# Patient Record
Sex: FEMALE | Race: WHITE | NOT HISPANIC OR LATINO | URBAN - METROPOLITAN AREA
[De-identification: names, ages, dates, MRNs, and addresses within clinical notes are randomized per-mention and may not be internally consistent; named-entity substitution may affect disease eponyms.]

---

## 2017-09-12 ENCOUNTER — EMERGENCY (EMERGENCY)
Facility: HOSPITAL | Age: 72
LOS: 1 days | Discharge: HOME | End: 2017-09-12
Attending: EMERGENCY MEDICINE | Admitting: EMERGENCY MEDICINE
Payer: COMMERCIAL

## 2017-09-12 PROCEDURE — 99282 EMERGENCY DEPT VISIT SF MDM: CPT

## 2018-01-02 ENCOUNTER — APPOINTMENT (OUTPATIENT)
Dept: CARDIOLOGY | Facility: CLINIC | Age: 73
End: 2018-01-02
Payer: MEDICARE

## 2018-01-02 PROCEDURE — 99214 OFFICE O/P EST MOD 30 MIN: CPT

## 2018-01-02 PROCEDURE — 93000 ELECTROCARDIOGRAM COMPLETE: CPT

## 2018-03-15 ENCOUNTER — RECORD ABSTRACTING (OUTPATIENT)
Age: 73
End: 2018-03-15

## 2018-03-15 DIAGNOSIS — R01.1 CARDIAC MURMUR, UNSPECIFIED: ICD-10-CM

## 2018-03-15 DIAGNOSIS — R42 DIZZINESS AND GIDDINESS: ICD-10-CM

## 2018-03-16 RX ORDER — ASPIRIN 81 MG
81 TABLET, DELAYED RELEASE (ENTERIC COATED) ORAL DAILY
Qty: 90 | Refills: 3 | Status: ACTIVE | COMMUNITY

## 2018-04-04 ENCOUNTER — APPOINTMENT (OUTPATIENT)
Dept: CARDIOLOGY | Facility: CLINIC | Age: 73
End: 2018-04-04
Payer: MEDICARE

## 2018-04-04 VITALS
DIASTOLIC BLOOD PRESSURE: 80 MMHG | BODY MASS INDEX: 41.35 KG/M2 | HEART RATE: 85 BPM | HEIGHT: 61 IN | WEIGHT: 219 LBS | RESPIRATION RATE: 14 BRPM | SYSTOLIC BLOOD PRESSURE: 142 MMHG

## 2018-04-04 PROCEDURE — 99214 OFFICE O/P EST MOD 30 MIN: CPT

## 2018-04-04 PROCEDURE — 93000 ELECTROCARDIOGRAM COMPLETE: CPT

## 2018-04-04 RX ORDER — AZITHROMYCIN 250 MG/1
250 TABLET, FILM COATED ORAL
Qty: 6 | Refills: 0 | Status: COMPLETED | COMMUNITY
Start: 2017-10-16

## 2018-04-04 RX ORDER — METFORMIN ER 750 MG 750 MG/1
750 TABLET ORAL
Qty: 90 | Refills: 0 | Status: ACTIVE | COMMUNITY
Start: 2017-12-28

## 2018-04-04 RX ORDER — ALBUTEROL SULFATE 90 UG/1
108 (90 BASE) AEROSOL, METERED RESPIRATORY (INHALATION)
Qty: 8 | Refills: 0 | Status: ACTIVE | COMMUNITY
Start: 2018-02-06

## 2018-04-04 RX ORDER — UBIDECARENONE 200 MG
CAPSULE ORAL
Refills: 0 | Status: ACTIVE | COMMUNITY

## 2018-06-06 ENCOUNTER — APPOINTMENT (OUTPATIENT)
Dept: CARDIOLOGY | Facility: CLINIC | Age: 73
End: 2018-06-06
Payer: MEDICARE

## 2018-06-06 PROCEDURE — 93306 TTE W/DOPPLER COMPLETE: CPT

## 2018-10-03 ENCOUNTER — APPOINTMENT (OUTPATIENT)
Dept: CARDIOLOGY | Facility: CLINIC | Age: 73
End: 2018-10-03
Payer: MEDICARE

## 2018-10-03 VITALS
WEIGHT: 220 LBS | DIASTOLIC BLOOD PRESSURE: 70 MMHG | HEIGHT: 61 IN | RESPIRATION RATE: 15 BRPM | HEART RATE: 88 BPM | SYSTOLIC BLOOD PRESSURE: 130 MMHG | BODY MASS INDEX: 41.54 KG/M2

## 2018-10-03 DIAGNOSIS — R53.83 OTHER FATIGUE: ICD-10-CM

## 2018-10-03 PROCEDURE — 99214 OFFICE O/P EST MOD 30 MIN: CPT

## 2018-10-03 PROCEDURE — 93000 ELECTROCARDIOGRAM COMPLETE: CPT

## 2018-10-03 RX ORDER — VORTIOXETINE 10 MG/1
10 TABLET, FILM COATED ORAL
Refills: 0 | Status: ACTIVE | COMMUNITY

## 2018-10-03 RX ORDER — MUPIROCIN 20 MG/G
2 OINTMENT TOPICAL
Qty: 22 | Refills: 0 | Status: DISCONTINUED | COMMUNITY
Start: 2017-11-13 | End: 2018-10-03

## 2019-04-01 ENCOUNTER — APPOINTMENT (OUTPATIENT)
Dept: CARDIOLOGY | Facility: CLINIC | Age: 74
End: 2019-04-01
Payer: MEDICARE

## 2019-04-01 ENCOUNTER — NON-APPOINTMENT (OUTPATIENT)
Age: 74
End: 2019-04-01

## 2019-04-01 VITALS
HEART RATE: 84 BPM | RESPIRATION RATE: 15 BRPM | SYSTOLIC BLOOD PRESSURE: 139 MMHG | WEIGHT: 229 LBS | HEIGHT: 61 IN | DIASTOLIC BLOOD PRESSURE: 79 MMHG | BODY MASS INDEX: 43.23 KG/M2

## 2019-04-01 DIAGNOSIS — M79.7 FIBROMYALGIA: ICD-10-CM

## 2019-04-01 PROCEDURE — 99214 OFFICE O/P EST MOD 30 MIN: CPT

## 2019-04-01 PROCEDURE — 93000 ELECTROCARDIOGRAM COMPLETE: CPT

## 2019-04-01 NOTE — REVIEW OF SYSTEMS
[Feeling Fatigued] : feeling fatigued [see HPI] : see HPI [Lower Back Pain] : lower back pain [Negative] : Heme/Lymph

## 2019-04-02 NOTE — ASSESSMENT
[FreeTextEntry1] : 1.  EKG today demonstrates sinus rhythm at 84 bpm.  Normal intervals.  No evidence of ischemia.  \par 2.  Fatigue:  Patient has gained nine pounds since her last office visit and is sedentary due to back issues.  I have advised her on a strict low-caloric diet.  \par 3.  Peripheral edema:  Patient has developed at least 1+ bilateral pitting edema.  Less than ideally compliant with a low-salt diet.  States she eats a lot of cheese.  Will add Furosemide 20 mg daily to current medical regimen.  Will have patient undergo bloodwork in four weeks to assess renal function.  A vitamin D3 level will be obtained as well.  Follow up office visit six weeks.   \par

## 2019-04-02 NOTE — PHYSICAL EXAM
[General Appearance - Well Developed] : well developed [General Appearance - In No Acute Distress] : no acute distress [Normal Conjunctiva] : the conjunctiva exhibited no abnormalities [Normal Oral Mucosa] : normal oral mucosa [Normal Oropharynx] : normal oropharynx [Auscultation Breath Sounds / Voice Sounds] : lungs were clear to auscultation bilaterally [Heart Rate And Rhythm] : heart rate and rhythm were normal [Heart Sounds] : normal S1 and S2 [Systolic grade ___/6] : A grade [unfilled]/6 systolic murmur was heard. [Bowel Sounds] : normal bowel sounds [Abnormal Walk] : normal gait [Skin Color & Pigmentation] : normal skin color and pigmentation [Skin Turgor] : normal skin turgor [Oriented To Time, Place, And Person] : oriented to person, place, and time [Affect] : the affect was normal [Mood] : the mood was normal [FreeTextEntry1] : B/L 1+ edema

## 2019-04-02 NOTE — HISTORY OF PRESENT ILLNESS
[FreeTextEntry1] : Mrs. Massey presents today without complaints of exertional chest pain, shortness of breath, palpitations, lightheadedness, or syncope.  She continues to complain of fatigue.  She recently underwent some bloodwork which revealed normal thyroid function as well as a negative test for Lyme’s disease.  She is currently on vitamin D for prior Hypovitaminosis D diagnosis.

## 2019-04-02 NOTE — REASON FOR VISIT
[FreeTextEntry1] : Mrs. Massey is a delightful 74-year-old obese white female with a past medical history significant for hypertension, hypercholesterolemia, who presents for evaluation.

## 2019-05-16 ENCOUNTER — NON-APPOINTMENT (OUTPATIENT)
Age: 74
End: 2019-05-16

## 2019-05-16 ENCOUNTER — APPOINTMENT (OUTPATIENT)
Dept: CARDIOLOGY | Facility: CLINIC | Age: 74
End: 2019-05-16
Payer: MEDICARE

## 2019-05-16 VITALS
HEIGHT: 61 IN | DIASTOLIC BLOOD PRESSURE: 80 MMHG | SYSTOLIC BLOOD PRESSURE: 139 MMHG | HEART RATE: 78 BPM | WEIGHT: 235 LBS | BODY MASS INDEX: 44.37 KG/M2 | RESPIRATION RATE: 15 BRPM

## 2019-05-16 PROCEDURE — 99214 OFFICE O/P EST MOD 30 MIN: CPT

## 2019-05-16 PROCEDURE — 93000 ELECTROCARDIOGRAM COMPLETE: CPT

## 2019-05-16 NOTE — REVIEW OF SYSTEMS
[Feeling Fatigued] : feeling fatigued [Lower Back Pain] : lower back pain [see HPI] : see HPI [Memory Lapses Or Loss] : memory lapses or loss [Negative] : Heme/Lymph

## 2019-05-17 NOTE — REASON FOR VISIT
[FreeTextEntry1] : Mrs. Massey is a pleasant 73-year-old white female with a past medical history significant for hypertension, hypercholesterolemia, and obesity who presents for follow up evaluation.

## 2019-05-17 NOTE — ASSESSMENT
[FreeTextEntry1] : 1.  EKG today reveals normal sinus rhythm at 78 bpm.  Normal intervals.  No evidence of ischemia.  \par 2.  Hypertension:  Blood pressure under reasonable control at this time.  \par 3.  Hyperlipidemia:  Patient advised on a low-fat / low-cholesterol diet.  \par 4.  Diabetes mellitus:  Recent fasting glucose 173 with hemoglobin A1C of 9.0.  Patient advised on a strict low-carbohydrate diet and follow up with his PCP.\par 5.  Peripheral edema:  20 mg of Furosemide every other day has not improved the patient’s edema.  She is advised to augment now to 20 mg daily and cut back on her salt intake.  Was advised to follow up with her PCP.  If clinically stable, follow up here three months.    \par \par

## 2019-05-17 NOTE — HISTORY OF PRESENT ILLNESS
[FreeTextEntry1] : Mrs. Massey presents today without complaints of exertional chest pain or shortness of breath.  She was recently evaluated by neurology for forgetfulness and was felt to have elements of dementia on brain MRI.  She was started on Aricept.  She also is felt to have obstructive sleep apnea and last night underwent a sleep study.  Results pending.

## 2019-08-20 ENCOUNTER — NON-APPOINTMENT (OUTPATIENT)
Age: 74
End: 2019-08-20

## 2019-08-20 ENCOUNTER — APPOINTMENT (OUTPATIENT)
Dept: CARDIOLOGY | Facility: CLINIC | Age: 74
End: 2019-08-20
Payer: MEDICARE

## 2019-08-20 VITALS
HEART RATE: 96 BPM | SYSTOLIC BLOOD PRESSURE: 118 MMHG | BODY MASS INDEX: 43.61 KG/M2 | DIASTOLIC BLOOD PRESSURE: 68 MMHG | WEIGHT: 231 LBS | RESPIRATION RATE: 15 BRPM | HEIGHT: 61 IN

## 2019-08-20 PROCEDURE — 93000 ELECTROCARDIOGRAM COMPLETE: CPT

## 2019-08-20 PROCEDURE — 99214 OFFICE O/P EST MOD 30 MIN: CPT

## 2019-08-20 RX ORDER — MIRABEGRON 25 MG/1
25 TABLET, FILM COATED, EXTENDED RELEASE ORAL
Refills: 0 | Status: ACTIVE | COMMUNITY

## 2019-08-20 RX ORDER — TURMERIC ROOT EXTRACT 500 MG
TABLET ORAL
Refills: 0 | Status: ACTIVE | COMMUNITY

## 2019-08-20 RX ORDER — FLUTICASONE FUROATE AND VILANTEROL TRIFENATATE 100; 25 UG/1; UG/1
100-25 POWDER RESPIRATORY (INHALATION)
Qty: 60 | Refills: 0 | Status: DISCONTINUED | COMMUNITY
Start: 2017-10-16 | End: 2019-08-20

## 2019-08-21 NOTE — ASSESSMENT
[FreeTextEntry1] : 1.  EKG today reveals sinus rhythm at 96 bpm.  Normal intervals.  No evidence of ischemia. \par 2.  Lightheadedness/weakness.  Patient with lower than usual blood pressure.  Will reduce Lisinopril from 20 to 10 mg daily.  Patient to continue other medications. \par 3.  Peripheral edema:  At this time, no additional diuretic therapy can be offered.  Once Lisinopril reduction enhances blood pressure, may consider augmenting Furosemide to a 40 mg a day dose.  Patient advised on a strict low-salt diet and follow up within six weeks.    \par

## 2019-08-21 NOTE — PHYSICAL EXAM
[General Appearance - Well Developed] : well developed [General Appearance - In No Acute Distress] : no acute distress [Normal Conjunctiva] : the conjunctiva exhibited no abnormalities [Normal Oral Mucosa] : normal oral mucosa [Normal Oropharynx] : normal oropharynx [Auscultation Breath Sounds / Voice Sounds] : lungs were clear to auscultation bilaterally [Heart Rate And Rhythm] : heart rate and rhythm were normal [Heart Sounds] : normal S1 and S2 [Systolic grade ___/6] : A grade [unfilled]/6 systolic murmur was heard. [Bowel Sounds] : normal bowel sounds [Abnormal Walk] : normal gait [Skin Color & Pigmentation] : normal skin color and pigmentation [Skin Turgor] : normal skin turgor [Oriented To Time, Place, And Person] : oriented to person, place, and time [Affect] : the affect was normal [Mood] : the mood was normal [FreeTextEntry1] : S4 gallop.

## 2019-08-21 NOTE — REASON FOR VISIT
[FreeTextEntry1] : Mrs. Massey is a pleasant 74-year-old morbidly obese white female with a past medical history significant for hypertension, hyperlipidemia, obstructive sleep apnea, and early dementia, who presents for evaluation.

## 2019-08-21 NOTE — HISTORY OF PRESENT ILLNESS
[FreeTextEntry1] : From a cardiac standpoint, she denies exertional chest pain, shortness of breath, palpitations, lightheadedness, or syncope.  She states she recently underwent a sleep study and has profound obstructive sleep apnea.  She is currently being fitted for CPAP mask. \par   \par

## 2019-09-27 ENCOUNTER — APPOINTMENT (OUTPATIENT)
Dept: CARDIOLOGY | Facility: CLINIC | Age: 74
End: 2019-09-27
Payer: MEDICARE

## 2019-09-27 ENCOUNTER — NON-APPOINTMENT (OUTPATIENT)
Age: 74
End: 2019-09-27

## 2019-09-27 VITALS
WEIGHT: 232 LBS | HEART RATE: 81 BPM | DIASTOLIC BLOOD PRESSURE: 72 MMHG | HEIGHT: 61 IN | OXYGEN SATURATION: 96 % | RESPIRATION RATE: 15 BRPM | BODY MASS INDEX: 43.8 KG/M2 | SYSTOLIC BLOOD PRESSURE: 118 MMHG

## 2019-09-27 PROCEDURE — 99214 OFFICE O/P EST MOD 30 MIN: CPT

## 2019-09-27 PROCEDURE — 93000 ELECTROCARDIOGRAM COMPLETE: CPT

## 2019-10-07 NOTE — HISTORY OF PRESENT ILLNESS
[FreeTextEntry1] : Mrs. Massey presents today for follow up evaluation.  Presently she is without complaints of chest pain, shortness of breath, palpitations, lightheadedness or syncope.  Her blood pressure today is the same as previous visit even with the decrease in lisinopril dose.  Does not feel any improvement of her lightheadedness/weakness.  Admits that she is not very compliant with her diet and low sodium intake.

## 2019-10-07 NOTE — DISCUSSION/SUMMARY
[FreeTextEntry1] : Case and plan discussed with Dr. Olivera.\par \par 1 - Lightheadedness/weakness:  blood pressure still on the low side for patient even with the decrease in lisinopril to 10mg daily.  Does not feel much improvement in her lightheadedness.  Will decrease lisinopril to 5mg daily.\par \par 2 - Peripheral edema: patient and  both state that there has been some improvement.  However, her left leg is more edematous than her right.  WIll order a venous duplex to r/o any possible DVT.\par \par 3 - Follow up in 5 weeks.

## 2019-10-07 NOTE — REASON FOR VISIT
[FreeTextEntry1] : The patient is a 74 y.o. morbidly obese white female with a past medical history significant for hypertension, hyperlipidemia, obstructive sleep apnea and early dementia who presents for evaluation.

## 2019-10-07 NOTE — PHYSICAL EXAM
[General Appearance - In No Acute Distress] : no acute distress [General Appearance - Well Developed] : well developed [Normal Conjunctiva] : the conjunctiva exhibited no abnormalities [Normal Oral Mucosa] : normal oral mucosa [] : no respiratory distress [Auscultation Breath Sounds / Voice Sounds] : lungs were clear to auscultation bilaterally [Heart Rate And Rhythm] : heart rate and rhythm were normal [Heart Sounds] : normal S1 and S2 [Bowel Sounds] : normal bowel sounds [Skin Color & Pigmentation] : normal skin color and pigmentation [Skin Turgor] : normal skin turgor [Oriented To Time, Place, And Person] : oriented to person, place, and time [Affect] : the affect was normal [Mood] : the mood was normal [FreeTextEntry1] : Walks with cane

## 2019-10-11 ENCOUNTER — APPOINTMENT (OUTPATIENT)
Dept: CARDIOLOGY | Facility: CLINIC | Age: 74
End: 2019-10-11
Payer: MEDICARE

## 2019-10-11 PROCEDURE — 93970 EXTREMITY STUDY: CPT

## 2019-11-15 ENCOUNTER — APPOINTMENT (OUTPATIENT)
Dept: CARDIOLOGY | Facility: CLINIC | Age: 74
End: 2019-11-15
Payer: MEDICARE

## 2019-11-15 ENCOUNTER — NON-APPOINTMENT (OUTPATIENT)
Age: 74
End: 2019-11-15

## 2019-11-15 VITALS
SYSTOLIC BLOOD PRESSURE: 130 MMHG | DIASTOLIC BLOOD PRESSURE: 70 MMHG | HEART RATE: 87 BPM | WEIGHT: 230 LBS | BODY MASS INDEX: 43.43 KG/M2 | RESPIRATION RATE: 16 BRPM | HEIGHT: 61 IN

## 2019-11-15 PROCEDURE — 93000 ELECTROCARDIOGRAM COMPLETE: CPT

## 2019-11-15 PROCEDURE — 99214 OFFICE O/P EST MOD 30 MIN: CPT

## 2019-11-15 NOTE — REASON FOR VISIT
[FreeTextEntry1] : The patient is a 74 y.o. morbidly obese white female with a past medical history significant for hypertension, hyperlipidemia, obstructive sleep apnea and early dementia who presents for follow up evaluation.

## 2019-11-15 NOTE — DISCUSSION/SUMMARY
[FreeTextEntry1] : 1 - Hypertension:  blood pressure well controlled on current medications.  Lightheadedness is somewhat improved with the decrease in lisinopril dose.  Advised to follow strict low sodium diet.  \par \par 2 - Peripheral edema:  edema is much improved.  Recent venous duplex showed no evidence of DVT in the lower extremities.\par \par 3 - Morbid obesity:  patient states she feels somewhat fatigued.  Advised to increase her physical activity a bit as she is quite sedentary.  SHould follow a low caloric diet and attempt weight loss.\par \par 4 - Will obtain recent labs done at her PCPs office.  Fasting labs prior to follow visit.\par \par 5 - Follow up with Dr. Olivera in 3 months.

## 2019-11-15 NOTE — HISTORY OF PRESENT ILLNESS
[FreeTextEntry1] : Mrs. Massey presents today for follow up evaluation and results of her recent venous duplex.  Presently without exertional chest pain, palpitations, or syncope.  She does experience some mild shortness of breath with exertion.  Lightheadedness has improved somewhat with the decrease in lisinopril to 5mg daily.  Admits that she is very sedentary.  Also not very compliant with her diet or low sodium intake.

## 2019-11-15 NOTE — PHYSICAL EXAM
[General Appearance - Well Developed] : well developed [General Appearance - In No Acute Distress] : no acute distress [Normal Conjunctiva] : the conjunctiva exhibited no abnormalities [Normal Oral Mucosa] : normal oral mucosa [] : no respiratory distress [Auscultation Breath Sounds / Voice Sounds] : lungs were clear to auscultation bilaterally [Heart Rate And Rhythm] : heart rate and rhythm were normal [Heart Sounds] : normal S1 and S2 [Bowel Sounds] : normal bowel sounds [Skin Color & Pigmentation] : normal skin color and pigmentation [Skin Turgor] : normal skin turgor [Oriented To Time, Place, And Person] : oriented to person, place, and time [Affect] : the affect was normal [Mood] : the mood was normal [FreeTextEntry1] : Trace bilateral LE edema

## 2020-02-14 ENCOUNTER — APPOINTMENT (OUTPATIENT)
Dept: CARDIOLOGY | Facility: CLINIC | Age: 75
End: 2020-02-14
Payer: MEDICARE

## 2020-02-14 VITALS
HEIGHT: 61 IN | RESPIRATION RATE: 16 BRPM | HEART RATE: 81 BPM | DIASTOLIC BLOOD PRESSURE: 60 MMHG | WEIGHT: 226 LBS | BODY MASS INDEX: 42.67 KG/M2 | SYSTOLIC BLOOD PRESSURE: 120 MMHG

## 2020-02-14 DIAGNOSIS — E55.9 VITAMIN D DEFICIENCY, UNSPECIFIED: ICD-10-CM

## 2020-02-14 PROCEDURE — 93000 ELECTROCARDIOGRAM COMPLETE: CPT

## 2020-02-14 PROCEDURE — 99214 OFFICE O/P EST MOD 30 MIN: CPT

## 2020-02-14 RX ORDER — MECLIZINE HYDROCHLORIDE 12.5 MG/1
12.5 TABLET ORAL
Refills: 0 | Status: DISCONTINUED | COMMUNITY
End: 2020-02-14

## 2020-02-14 RX ORDER — DONEPEZIL HYDROCHLORIDE 10 MG/1
10 TABLET, FILM COATED ORAL
Refills: 0 | Status: DISCONTINUED | COMMUNITY
End: 2020-02-14

## 2020-02-14 RX ORDER — LISINOPRIL 5 MG/1
5 TABLET ORAL DAILY
Qty: 90 | Refills: 1 | Status: DISCONTINUED | COMMUNITY
Start: 2019-09-27 | End: 2020-02-14

## 2020-02-17 NOTE — HISTORY OF PRESENT ILLNESS
[FreeTextEntry1] :   From a cardiac standpoint, Mrs. Massey denies exertional chest pain at this time.  She does admit to dyspnea on exertion which she continues to blame on her weight and lack of physical exercise.

## 2020-02-17 NOTE — ASSESSMENT
[FreeTextEntry1] :  1.  EKG today reveals sinus rhythm at 81 bpm.  Normal intervals.  No evidence of ischemia.   2.  Hypertension:  Blood pressure well controlled at this time on current medications.    3.  Peripheral edema:  Peripheral edema persists despite current medications.  A stricter low-salt diet is advised.  Patient also advised to increase Furosemide from 20 to 40 mg daily.  Potassium supplementation (KCL 20 mEq daily) prescribed.  Patient will undergo follow up bloodwork to assess renal function in approximately 2 ½ weeks with a follow up office visit thereafter.   4.  Diabetes mellitus:  Recent fasting glucose 239 with a hemoglobin A1C at 8.4.  Patient advised on a stricter low-carbohydrate diet as well as follow up through her PCP’s office to adjust current medications.

## 2020-02-17 NOTE — PHYSICAL EXAM
[General Appearance - In No Acute Distress] : no acute distress [General Appearance - Well Developed] : well developed [Normal Conjunctiva] : the conjunctiva exhibited no abnormalities [Normal Oropharynx] : normal oropharynx [Normal Oral Mucosa] : normal oral mucosa [Heart Rate And Rhythm] : heart rate and rhythm were normal [Auscultation Breath Sounds / Voice Sounds] : lungs were clear to auscultation bilaterally [Heart Sounds] : normal S1 and S2 [Systolic grade ___/6] : A grade [unfilled]/6 systolic murmur was heard. [Abnormal Walk] : normal gait [Bowel Sounds] : normal bowel sounds [Oriented To Time, Place, And Person] : oriented to person, place, and time [Skin Turgor] : normal skin turgor [Skin Color & Pigmentation] : normal skin color and pigmentation [Mood] : the mood was normal [Affect] : the affect was normal [FreeTextEntry1] : B/L 1-2+ edema

## 2020-02-17 NOTE — REASON FOR VISIT
[FreeTextEntry1] : Mrs. Massey is a pleasant 74-year-old obese white female with a past medical history significant for hypertension, hyperlipidemia, obstructive sleep apnea, and early dementia, who presents for follow up evaluation.

## 2020-02-17 NOTE — REVIEW OF SYSTEMS
[Feeling Fatigued] : feeling fatigued [Lower Back Pain] : lower back pain [see HPI] : see HPI [Memory Lapses Or Loss] : memory lapses or loss [Negative] : Integumentary

## 2020-03-04 ENCOUNTER — APPOINTMENT (OUTPATIENT)
Dept: CARDIOLOGY | Facility: CLINIC | Age: 75
End: 2020-03-04
Payer: MEDICARE

## 2020-03-04 VITALS
WEIGHT: 222 LBS | HEIGHT: 61 IN | DIASTOLIC BLOOD PRESSURE: 84 MMHG | RESPIRATION RATE: 16 BRPM | SYSTOLIC BLOOD PRESSURE: 138 MMHG | BODY MASS INDEX: 41.91 KG/M2 | OXYGEN SATURATION: 96 % | HEART RATE: 89 BPM

## 2020-03-04 DIAGNOSIS — R60.9 EDEMA, UNSPECIFIED: ICD-10-CM

## 2020-03-04 PROCEDURE — 99214 OFFICE O/P EST MOD 30 MIN: CPT

## 2020-03-09 PROBLEM — R60.9 PERIPHERAL EDEMA: Status: ACTIVE | Noted: 2019-04-01

## 2020-03-09 NOTE — PHYSICAL EXAM
[General Appearance - Well Developed] : well developed [General Appearance - In No Acute Distress] : no acute distress [Normal Conjunctiva] : the conjunctiva exhibited no abnormalities [Normal Oral Mucosa] : normal oral mucosa [] : no respiratory distress [Auscultation Breath Sounds / Voice Sounds] : lungs were clear to auscultation bilaterally [Heart Rate And Rhythm] : heart rate and rhythm were normal [Heart Sounds] : normal S1 and S2 [Bowel Sounds] : normal bowel sounds [Abnormal Walk] : normal gait [FreeTextEntry1] : 1+ bilateral LE edema [Skin Color & Pigmentation] : normal skin color and pigmentation [Skin Turgor] : normal skin turgor [Oriented To Time, Place, And Person] : oriented to person, place, and time [Affect] : the affect was normal [Mood] : the mood was normal

## 2020-03-09 NOTE — DISCUSSION/SUMMARY
[FreeTextEntry1] : 1 - Hypertension:  blood pressure borderline controlled on current medications.  Advised to follow strict low sodium diet and weight loss.\par \par 2 - Peripheral Edema:  LE edema has improved a bit.  Will continue with current diuretic therapy.   BUN 18, creatinine 0.65, potassium 4.5.\par \par 3 - Fasting blood work prior to follow up.\par \par 3 - Follow up in 3 months.\par \par

## 2020-03-09 NOTE — HISTORY OF PRESENT ILLNESS
[FreeTextEntry1] : Mrs. Massey presents today for follow up evaluation.  Presently feeling well.  Denies complaints of chest pain, shortness of breath, palpitations, lightheadedness or syncope.  Tolerating the increase of furosemide to 40 mg daily and the potassium.

## 2020-03-09 NOTE — REASON FOR VISIT
[FreeTextEntry1] : The patient is a 74 year-old morbidly obese white female with a past medical history significant for hypertension, hyperlipidemia, obstructive sleep apnea and early dementia who presents for follow up evaluation.

## 2020-04-28 ENCOUNTER — RX RENEWAL (OUTPATIENT)
Age: 75
End: 2020-04-28

## 2020-06-03 ENCOUNTER — APPOINTMENT (OUTPATIENT)
Dept: CARDIOLOGY | Facility: CLINIC | Age: 75
End: 2020-06-03
Payer: MEDICARE

## 2020-06-03 PROCEDURE — 99441: CPT | Mod: 95

## 2020-06-03 RX ORDER — FUROSEMIDE 20 MG/1
20 TABLET ORAL DAILY
Qty: 180 | Refills: 3 | Status: ACTIVE | COMMUNITY
Start: 2019-04-01 | End: 1900-01-01

## 2020-06-03 RX ORDER — SEMAGLUTIDE 0.68 MG/ML
INJECTION, SOLUTION SUBCUTANEOUS
Refills: 0 | Status: ACTIVE | COMMUNITY

## 2020-10-13 ENCOUNTER — APPOINTMENT (OUTPATIENT)
Dept: CARDIOLOGY | Facility: CLINIC | Age: 75
End: 2020-10-13
Payer: MEDICARE

## 2020-10-13 VITALS
RESPIRATION RATE: 15 BRPM | TEMPERATURE: 96.7 F | DIASTOLIC BLOOD PRESSURE: 70 MMHG | WEIGHT: 203 LBS | HEART RATE: 147 BPM | SYSTOLIC BLOOD PRESSURE: 118 MMHG | HEIGHT: 61 IN | BODY MASS INDEX: 38.33 KG/M2

## 2020-10-13 PROCEDURE — 93000 ELECTROCARDIOGRAM COMPLETE: CPT

## 2020-10-13 PROCEDURE — 99213 OFFICE O/P EST LOW 20 MIN: CPT

## 2020-10-14 NOTE — REASON FOR VISIT
[FreeTextEntry1] : Mrs. Massey is a pleasant 75-year-old white female with a past medical history significant for hyperlipidemia, obesity/obstructive sleep apnea, and early dementia, who presents for follow up. \par \par

## 2020-10-14 NOTE — ASSESSMENT
[FreeTextEntry1] : 1.  EKG today reveals normal sinus rhythm with sinus arrhythmia at 86 bpm.  Low voltage noted.  Right bundle branch block.  Qs in leads III and aVF which are a chronic finding for this patient.  No acute ischemic changes. \par \par 2.  Hypertension:  Blood pressure well controlled at this time on current medications.  Patient has lost nearly 20 pounds since her last office visit.  A low-salt diet was discussed. \par \par 3.  Hyperlipidemia:  Review of recent lipid profile demonstrates total cholesterol of 164, HDL 59, TC/HDL ratio 2.8, LDL 73, triglycerides 159.  Patient advised to continue current medications and follow a low-fat / low-cholesterol diet.  If clinically stable, follow up office visit six months. \par

## 2020-10-14 NOTE — HISTORY OF PRESENT ILLNESS
[FreeTextEntry1] :  From a cardiac standpoint, Mrs. Massey remains clinically stable denying exertional chest pain, shortness of breath, or other cardiac symptoms.

## 2020-10-14 NOTE — PHYSICAL EXAM
[General Appearance - Well Developed] : well developed [General Appearance - In No Acute Distress] : no acute distress [Normal Conjunctiva] : the conjunctiva exhibited no abnormalities [Normal Oral Mucosa] : normal oral mucosa [Normal Oropharynx] : normal oropharynx [Auscultation Breath Sounds / Voice Sounds] : lungs were clear to auscultation bilaterally [Heart Rate And Rhythm] : heart rate and rhythm were normal [Heart Sounds] : normal S1 and S2 [Systolic grade ___/6] : A grade [unfilled]/6 systolic murmur was heard. [Bowel Sounds] : normal bowel sounds [Abnormal Walk] : normal gait [Skin Color & Pigmentation] : normal skin color and pigmentation [Skin Turgor] : normal skin turgor [Oriented To Time, Place, And Person] : oriented to person, place, and time [Affect] : the affect was normal [Mood] : the mood was normal [FreeTextEntry1] : B/L 1-2+ edema

## 2020-11-20 RX ORDER — VITAMIN K2 90 MCG
125 MCG CAPSULE ORAL
Qty: 90 | Refills: 1 | Status: DISCONTINUED | COMMUNITY
Start: 2020-02-14 | End: 2020-11-20

## 2020-11-20 RX ORDER — VITAMIN K2 90 MCG
125 MCG CAPSULE ORAL
Qty: 90 | Refills: 2 | Status: ACTIVE | COMMUNITY
Start: 2018-10-03 | End: 1900-01-01

## 2021-04-13 ENCOUNTER — APPOINTMENT (OUTPATIENT)
Dept: CARDIOLOGY | Facility: CLINIC | Age: 76
End: 2021-04-13
Payer: MEDICARE

## 2021-04-13 VITALS
HEART RATE: 104 BPM | HEIGHT: 61 IN | TEMPERATURE: 97.3 F | SYSTOLIC BLOOD PRESSURE: 122 MMHG | DIASTOLIC BLOOD PRESSURE: 79 MMHG | RESPIRATION RATE: 16 BRPM

## 2021-04-13 DIAGNOSIS — Z99.89 OBSTRUCTIVE SLEEP APNEA (ADULT) (PEDIATRIC): ICD-10-CM

## 2021-04-13 DIAGNOSIS — E11.9 TYPE 2 DIABETES MELLITUS W/OUT COMPLICATIONS: ICD-10-CM

## 2021-04-13 DIAGNOSIS — G47.33 OBSTRUCTIVE SLEEP APNEA (ADULT) (PEDIATRIC): ICD-10-CM

## 2021-04-13 PROCEDURE — 99214 OFFICE O/P EST MOD 30 MIN: CPT

## 2021-04-13 PROCEDURE — 93000 ELECTROCARDIOGRAM COMPLETE: CPT

## 2021-04-15 NOTE — ASSESSMENT
[FreeTextEntry1] : 1.  EKG today reveals sinus tachycardia at 104 bpm.  One APC.  Right bundle branch block.  No acute ischemic changes. \par \par 2.  Hyperlipidemia:  Recent lipid profile demonstrates a total cholesterol of 163, HDL 51, LDL 62, triglycerides 49.  This is noted along with a fasting glucose of 182 and a hemoglobin A1C of 6.8.  Patient advised to continue current statin therapy as well as follow a low-fat / low-cholesterol / low-carbohydrate diet.  \par \par 3.  Palpitations:  Patient with a singular brief episode of palpitations.  If this becomes a recurring theme, will have patient undergo formal monitoring.  \par \par 4.  Progressive dementia:  Patient to follow up with PCP and neurology.  If clinically stable from a cardiac standpoint, office visit three months.   \par

## 2021-04-15 NOTE — HISTORY OF PRESENT ILLNESS
[FreeTextEntry1] :  From a cardiac standpoint, Mrs. Massey denies exertional chest pain or shortness of breath.  She admits to a singular short episode of palpitations which was not associated with lightheadedness or syncope.  This happened a month ago.  She has had no further symptoms.

## 2021-04-15 NOTE — REASON FOR VISIT
[FreeTextEntry1] : Mrs. Massey is a pleasant 75-year-old white female with a past medical history significant for hyperlipidemia, obesity with associated obstructive sleep apnea, and early progressive dementia, who presents for follow up evaluation.   \par \par

## 2021-07-09 ENCOUNTER — RX RENEWAL (OUTPATIENT)
Age: 76
End: 2021-07-09

## 2021-07-09 RX ORDER — POTASSIUM CHLORIDE 1500 MG/1
20 TABLET, FILM COATED, EXTENDED RELEASE ORAL
Qty: 90 | Refills: 1 | Status: ACTIVE | COMMUNITY
Start: 2020-02-14 | End: 1900-01-01

## 2021-07-13 ENCOUNTER — APPOINTMENT (OUTPATIENT)
Dept: CARDIOLOGY | Facility: CLINIC | Age: 76
End: 2021-07-13
Payer: MEDICARE

## 2021-07-13 VITALS
SYSTOLIC BLOOD PRESSURE: 104 MMHG | BODY MASS INDEX: 33.13 KG/M2 | HEART RATE: 116 BPM | RESPIRATION RATE: 15 BRPM | WEIGHT: 180 LBS | DIASTOLIC BLOOD PRESSURE: 73 MMHG | HEIGHT: 62 IN

## 2021-07-13 DIAGNOSIS — R00.0 TACHYCARDIA, UNSPECIFIED: ICD-10-CM

## 2021-07-13 DIAGNOSIS — I10 ESSENTIAL (PRIMARY) HYPERTENSION: ICD-10-CM

## 2021-07-13 DIAGNOSIS — F03.90 UNSPECIFIED DEMENTIA W/OUT BEHAVIORAL DISTURBANCE: ICD-10-CM

## 2021-07-13 DIAGNOSIS — E78.00 PURE HYPERCHOLESTEROLEMIA, UNSPECIFIED: ICD-10-CM

## 2021-07-13 DIAGNOSIS — E66.01 MORBID (SEVERE) OBESITY DUE TO EXCESS CALORIES: ICD-10-CM

## 2021-07-13 PROCEDURE — 93000 ELECTROCARDIOGRAM COMPLETE: CPT

## 2021-07-13 PROCEDURE — 99214 OFFICE O/P EST MOD 30 MIN: CPT

## 2021-07-15 NOTE — REASON FOR VISIT
[FreeTextEntry1] : Mrs. Massey is a pleasant 76-year-old white female with a past medical history significant for hyperlipidemia, diabetes mellitus, obesity/obstructive sleep apnea, and progressive dementia, who presents for follow up evaluation.  \par

## 2021-07-15 NOTE — HISTORY OF PRESENT ILLNESS
[FreeTextEntry1] :  From a cardiac standpoint, Mrs. Massey denies exertional chest pain, shortness of breath, or palpitations.  She states she feels “well.”

## 2021-07-15 NOTE — ASSESSMENT
[FreeTextEntry1] : 1.  Sinus tachycardia on EKG.  Patient without symptoms but with a noted blood pressure of 104/73.  She has lost some 46 pounds in the past 18 months.  She may well be dehydrated at this time.  I have advised  to hold her Furosemide for the next three days and have Mrs. Massey hydrate as much as possible.  She may resume her diuretic therapy on Friday at 20 mg daily.  Will have her undergo bloodwork next week via home visit.  Patient will follow up with PCP.   states that it is very difficult to get her out of the house.  Will try and manage as best as possible through contact with her visiting nurses who come to the house 2x a week.  \par \par 2.  Review of bloodwork performed last month at her PCP’s office revealed a total cholesterol of 144, HDL 56, LDL 43, triglycerides 227, H and H 14.7 and 48.3, BUN 11, creatinine 0.8.  Repeat bloodwork to be performed in the next 7-10 days at home.  If clinically stable, office visit here three months.     \par

## 2021-07-15 NOTE — PHYSICAL EXAM
[General Appearance - Well Developed] : well developed [General Appearance - In No Acute Distress] : no acute distress [Normal Conjunctiva] : the conjunctiva exhibited no abnormalities [Normal Oral Mucosa] : normal oral mucosa [Normal Oropharynx] : normal oropharynx [Auscultation Breath Sounds / Voice Sounds] : lungs were clear to auscultation bilaterally [Heart Sounds] : normal S1 and S2 [Tachycardic ___] : the heart rate was tachycardic at [unfilled] bpm [Systolic grade ___/6] : A grade [unfilled]/6 systolic murmur was heard. [Bowel Sounds] : normal bowel sounds [Abnormal Walk] : normal gait [Skin Color & Pigmentation] : normal skin color and pigmentation [Oriented To Time, Place, And Person] : oriented to person, place, and time [Affect] : the affect was normal [Mood] : the mood was normal [FreeTextEntry1] : No edema

## 2021-07-30 ENCOUNTER — NON-APPOINTMENT (OUTPATIENT)
Age: 76
End: 2021-07-30

## 2021-10-20 ENCOUNTER — APPOINTMENT (OUTPATIENT)
Dept: CARDIOLOGY | Facility: CLINIC | Age: 76
End: 2021-10-20